# Patient Record
(demographics unavailable — no encounter records)

---

## 2025-02-05 NOTE — HISTORY OF PRESENT ILLNESS
[FreeTextEntry1] : 7-month-old female who presents today with parents for initial evaluation of right leg injury sustained on 01/20/2025. Mother states that she was carrying by someone while coming from pediatrician office, that person tripped and fell.  She was taken to [facility] where X-Rays right tib/fib were performed and confirmed a supracondylar femur fracture and recommended for orthopedic evaluation. She was placed in a long leg cast. Today mother reports that she is tolerating the cast well denies any discomfort or pain. Denies any tingling sensation or radiating pain. She is not taking any pain medication now. Here for further orthopedic evaluation.

## 2025-02-05 NOTE — CONSULT LETTER
[Dear  ___] : Dear  [unfilled], [Consult Letter:] : I had the pleasure of evaluating your patient, [unfilled]. [Please see my note below.] : Please see my note below. [Consult Closing:] : Thank you very much for allowing me to participate in the care of this patient.  If you have any questions, please do not hesitate to contact me. [Sincerely,] : Sincerely, [FreeTextEntry3] : Bhupinder Hunt MD [facility] [address] [place] [address] Phone: [phone] Fax: [phone]

## 2025-02-05 NOTE — PHYSICAL EXAM
[FreeTextEntry1] : Steph is an alert, comfortable, well-developed, in no distress, 7-month-old baby girl. She has a well fitting and intact right long leg cast which is removed. Her skin is intact. There are no clinical deformities. No tenderness to palpation. Neurovascularly grossly intact.

## 2025-02-05 NOTE — DEVELOPMENTAL MILESTONES
[Normal] : Developmental history within normal limits [Too Young] : too young  [Not Yet Determined] : not yet determined [FreeTextEntry2] : No [FreeTextEntry3] : N/A

## 2025-02-05 NOTE — DATA REVIEWED
[de-identified] : 3 views right knee IN CAST radiographs were ordered, obtained, and independently reviewed in clinic on 02/05/2025 depicting supracondylar femur fracture with acceptable alignment for age. Signs of interval healing noted. Skeletally immature individual.

## 2025-02-05 NOTE — ASSESSMENT
[FreeTextEntry1] : 7-month-old female with right supracondylar femur fracture sustained on 01/20/2025.  Today's visit included obtaining the history from the child and parent, due to the child's age, the child could not be considered a reliable historian, requiring the parent to act as an independent historian. The condition, natural history, and prognosis were explained to the patient and family. The clinical findings and images were reviewed with the family.  3 views right knee IN CAST radiographs were ordered, obtained, and independently reviewed in clinic on 02/05/2025 depicting supracondylar femoral fracture with acceptable alignment for age. Signs of interval healing noted. Skeletally immature individual. Her log leg cast was removed during today's visit. No orthopedic intervention needed at this time. No activity restrictions. Follow up as needed. Whole conversation was conducted by their native language in Mongolian.  All questions and concerns were addressed. Parents vocalized understanding and agreement to assessment and treatment.  I, Tenisha Coelho, have acted as a scribe and documented the above information for Dr. Hunt.  The above documentation completed by the scribe is an accurate record of both my words and actions. Bhupinder Hunt MD.  This note was generated using Dragon medical dictation software.  A reasonable effort has been made for proofreading its contents, but typos may still remain.  If there are any questions or points of clarification needed please do not hesitate to contact my office.

## 2025-02-05 NOTE — REASON FOR VISIT
[Consultation] : a consultation visit [Parents] : parents [FreeTextEntry1] : right leg injury sustained on 01/20/2025.